# Patient Record
Sex: MALE | Race: BLACK OR AFRICAN AMERICAN | Employment: FULL TIME | URBAN - METROPOLITAN AREA
[De-identification: names, ages, dates, MRNs, and addresses within clinical notes are randomized per-mention and may not be internally consistent; named-entity substitution may affect disease eponyms.]

---

## 2021-04-21 ENCOUNTER — HOSPITAL ENCOUNTER (EMERGENCY)
Age: 56
Discharge: HOME | End: 2021-04-21
Attending: EMERGENCY MEDICINE
Payer: MEDICAID

## 2021-04-21 VITALS
SYSTOLIC BLOOD PRESSURE: 110 MMHG | HEIGHT: 68 IN | OXYGEN SATURATION: 98 % | BODY MASS INDEX: 26.75 KG/M2 | WEIGHT: 176.5 LBS | TEMPERATURE: 97.7 F | RESPIRATION RATE: 18 BRPM | DIASTOLIC BLOOD PRESSURE: 75 MMHG | HEART RATE: 95 BPM

## 2021-04-21 DIAGNOSIS — M54.31 SCIATICA OF RIGHT SIDE: Primary | ICD-10-CM

## 2021-04-21 PROCEDURE — 99283 EMERGENCY DEPT VISIT LOW MDM: CPT

## 2021-04-21 PROCEDURE — 96372 THER/PROPH/DIAG INJ SC/IM: CPT

## 2021-04-21 PROCEDURE — 74011250636 HC RX REV CODE- 250/636: Performed by: PHYSICIAN ASSISTANT

## 2021-04-21 RX ORDER — METHOCARBAMOL 500 MG/1
500 TABLET, FILM COATED ORAL
Qty: 20 TAB | Refills: 0 | Status: SHIPPED | OUTPATIENT
Start: 2021-04-21

## 2021-04-21 RX ORDER — TRAMADOL HYDROCHLORIDE 50 MG/1
50 TABLET ORAL
Qty: 10 TAB | Refills: 0 | Status: SHIPPED | OUTPATIENT
Start: 2021-04-21 | End: 2021-04-24

## 2021-04-21 RX ORDER — METHYLPREDNISOLONE 4 MG/1
TABLET ORAL
Qty: 1 DOSE PACK | Refills: 0 | Status: SHIPPED | OUTPATIENT
Start: 2021-04-21

## 2021-04-21 RX ORDER — KETOROLAC TROMETHAMINE 30 MG/ML
60 INJECTION, SOLUTION INTRAMUSCULAR; INTRAVENOUS ONCE
Status: COMPLETED | OUTPATIENT
Start: 2021-04-21 | End: 2021-04-21

## 2021-04-21 RX ADMIN — KETOROLAC TROMETHAMINE 60 MG: 30 INJECTION, SOLUTION INTRAMUSCULAR; INTRAVENOUS at 15:42

## 2021-04-21 NOTE — ED TRIAGE NOTES
Pt in with right buttock pain radiating down right leg x 4 days, pain in right knee. Pt states he has been taking ibuprofen and muscle relaxers for pain without relief. He has a history of sciatica approximately 10 years ago. He denies recent injury.

## 2021-04-21 NOTE — ED NOTES
Pt arrives to the ED AAOX4 with a c/c of right buttock pain radiating to RLE onset Saturday. Pt denies any injury, but states he believes he has sciatica as this pain is similar to what he experiences 10+ years ago when he was diagnosed. Pt is noted in stable condition, now in ED room with side rail up, bed to lowest position and call light within reach. Will continue to monitor and wait for ED provider evaluation. Emergency Department Nursing Plan of Care       The Nursing Plan of Care is developed from the Nursing assessment and Emergency Department Attending provider initial evaluation. The plan of care may be reviewed in the ED Provider note.     The Plan of Care was developed with the following considerations:   Patient / Family readiness to learn indicated by:verbalized understanding  Persons(s) to be included in education: patient  Barriers to Learning/Limitations:No    Signed     Deann Plate    4/21/2021   3:35 PM

## 2021-04-21 NOTE — ED PROVIDER NOTES
EMERGENCY DEPARTMENT HISTORY AND PHYSICAL EXAM    Date: 4/21/2021  Patient Name: Rae Green    History of Presenting Illness     Chief Complaint   Patient presents with    Back Pain    Leg Pain         History Provided By: Patient    HPI: Rae Green is a 54 y.o. male with a PMH of No significant past medical history who presents with R sided lower back pain that radiates down the R leg x 4 days. Pt reports hx of sciatica about 10yrs ago and states it feels the same now. Pt denies any injury or trauma, no heavy lifting pushing or pulling, no bowel or bladder incontinence. Pain is exacerbated with certain movements. Pt rates pain 10/10. PCP: None    Current Outpatient Medications   Medication Sig Dispense Refill    methylPREDNISolone (Medrol, Madhav,) 4 mg tablet Take as instructed 1 Dose Pack 0    methocarbamoL (Robaxin) 500 mg tablet Take 1 Tab by mouth every six (6) hours as needed (mm spasm). 20 Tab 0    traMADoL (Ultram) 50 mg tablet Take 1 Tab by mouth every six (6) hours as needed for Pain for up to 3 days. Max Daily Amount: 200 mg. 10 Tab 0    ibuprofen (MOTRIN) 800 mg tablet Take  by mouth every six (6) hours as needed for Pain.  DM/PSEUDOEPHED/ACETAMINOPHEN (THERAFLU NO-DROWSY FLU/CLD/CGH PO) Take 2 tablets by mouth every four (4) hours as needed.  albuterol (PROVENTIL HFA, VENTOLIN HFA, PROAIR HFA) 90 mcg/actuation inhaler Take 4 puffs by inhalation every four (4) hours as needed for Wheezing. 1 Inhaler 1       Past History     Past Medical History:  History reviewed. No pertinent past medical history. Past Surgical History:  History reviewed. No pertinent surgical history. Family History:  History reviewed. No pertinent family history. Social History:  Social History     Tobacco Use    Smoking status: Current Every Day Smoker     Packs/day: 0.50    Smokeless tobacco: Never Used   Substance Use Topics    Alcohol use: Yes    Drug use:  No Allergies:  No Known Allergies      Review of Systems   Review of Systems   Constitutional: Positive for activity change. Gastrointestinal: Negative for abdominal pain. Genitourinary: Negative for difficulty urinating and dysuria. Musculoskeletal: Positive for back pain and myalgias. Skin: Negative. Allergic/Immunologic: Negative for immunocompromised state. Neurological: Positive for numbness. Negative for speech difficulty and weakness. Psychiatric/Behavioral: Negative for self-injury. All other systems reviewed and are negative. Physical Exam     Vitals:    04/21/21 1524 04/21/21 1600   BP: 106/79 110/75   Pulse: 96 95   Resp: 20 18   Temp: 97.7 °F (36.5 °C)    SpO2: 96% 98%   Weight: 80.1 kg (176 lb 8 oz)    Height: 5' 8\" (1.727 m)      Physical Exam  Vitals signs and nursing note reviewed. Constitutional:       General: He is not in acute distress. Appearance: He is well-developed. HENT:      Head: Normocephalic and atraumatic. Mouth/Throat:      Pharynx: No oropharyngeal exudate. Eyes:      Conjunctiva/sclera: Conjunctivae normal.   Cardiovascular:      Rate and Rhythm: Normal rate and regular rhythm. Heart sounds: Normal heart sounds. Pulmonary:      Effort: Pulmonary effort is normal. No respiratory distress. Breath sounds: Normal breath sounds. No wheezing or rales. Abdominal:      General: Bowel sounds are normal.      Palpations: Abdomen is soft. Musculoskeletal: Normal range of motion. Lumbar back: He exhibits tenderness and pain (at R sciatic region, +R SLR). He exhibits no bony tenderness, no swelling, no edema and no deformity. Back:    Skin:     General: Skin is warm and dry. Neurological:      Mental Status: He is alert and oriented to person, place, and time. Diagnostic Study Results     Labs -   No results found for this or any previous visit (from the past 12 hour(s)).     Radiologic Studies -   No orders to display CT Results  (Last 48 hours)    None        CXR Results  (Last 48 hours)    None            Medical Decision Making   I am the first provider for this patient. I reviewed the vital signs, available nursing notes, past medical history, past surgical history, family history and social history. Vital Signs-Reviewed the patient's vital signs. Records Reviewed: Nursing Notes and Old Medical Records    Provider Notes (Medical Decision Making): The patient complains of low back pain that radiates down the R leg. These symptoms are consistent with a sciatica. Less likely  pathology, aortic dissection or ruptured AAA, or cauda equina given that there are no red flags and a benign physical exam.     Disposition:  Discharged    DISCHARGE NOTE:   3:50 PM      Care plan outlined and precautions discussed. Patient has no new complaints, changes, or physical findings. All medications were reviewed with the patient; will d/c home. All of pt's questions and concerns were addressed. Patient was instructed and agrees to follow up with PCP  prn, as well as to return to the ED upon further deterioration. Patient is ready to go home. Follow-up Information     Follow up With Specialties Details Why Bradley Syed  [facility], [address] [place] [address] Drive  [phone]          Discharge Medication List as of 4/21/2021  3:52 PM      START taking these medications    Details   methylPREDNISolone (Medrol, Madhav,) 4 mg tablet Take as instructed, Normal, Disp-1 Dose Pack, R-0      methocarbamoL (Robaxin) 500 mg tablet Take 1 Tab by mouth every six (6) hours as needed (mm spasm). , Normal, Disp-20 Tab, R-0      traMADoL (Ultram) 50 mg tablet Take 1 Tab by mouth every six (6) hours as needed for Pain for up to 3 days.  Max Daily Amount: 200 mg., Normal, Disp-10 Tab, R-0         CONTINUE these medications which have NOT CHANGED    Details   ibuprofen (MOTRIN) 800 mg tablet Take  by mouth every six (6) hours as needed for Pain., Historical Med      DM/PSEUDOEPHED/ACETAMINOPHEN (THERAFLU NO-DROWSY FLU/CLD/CGH PO) Take 2 tablets by mouth every four (4) hours as needed., Historical Med      albuterol (PROVENTIL HFA, VENTOLIN HFA, PROAIR HFA) 90 mcg/actuation inhaler Take 4 puffs by inhalation every four (4) hours as needed for Wheezing., Print, Disp-1 Inhaler, R-1             Procedures:  Procedures    Please note that this dictation was completed with Dragon, computer voice recognition software. Quite often unanticipated grammatical, syntax, homophones, and other interpretive errors are inadvertently transcribed by the computer software. Please disregard these errors. Additionally, please excuse any errors that have escaped final proofreading. Diagnosis     Clinical Impression:   1.  Sciatica of right side

## 2021-04-21 NOTE — ED NOTES
Discharge instructions provided. Pt was given copy of discharge instructions with 0 paper script(s) and 3 electronic script(s). Pt verbalized understanding of the medication instructions, and the importance of following up as recommended by EDP. Pt has no further questions at this time. Wheelchair offered from treatment area to hospital entrance, pt declined. Pt leaving ED ambulatory and in stable condition.

## 2022-03-12 ENCOUNTER — APPOINTMENT (OUTPATIENT)
Dept: GENERAL RADIOLOGY | Age: 57
End: 2022-03-12
Attending: EMERGENCY MEDICINE
Payer: MEDICAID

## 2022-03-12 ENCOUNTER — APPOINTMENT (OUTPATIENT)
Dept: CT IMAGING | Age: 57
End: 2022-03-12
Attending: EMERGENCY MEDICINE
Payer: MEDICAID

## 2022-03-12 ENCOUNTER — HOSPITAL ENCOUNTER (EMERGENCY)
Age: 57
Discharge: HOME | End: 2022-03-12
Attending: EMERGENCY MEDICINE
Payer: MEDICAID

## 2022-03-12 VITALS
DIASTOLIC BLOOD PRESSURE: 86 MMHG | WEIGHT: 176.59 LBS | BODY MASS INDEX: 26.76 KG/M2 | OXYGEN SATURATION: 99 % | TEMPERATURE: 98.2 F | HEART RATE: 86 BPM | HEIGHT: 68 IN | RESPIRATION RATE: 17 BRPM | SYSTOLIC BLOOD PRESSURE: 158 MMHG

## 2022-03-12 DIAGNOSIS — S82.141A CLOSED FRACTURE OF RIGHT TIBIAL PLATEAU, INITIAL ENCOUNTER: Primary | ICD-10-CM

## 2022-03-12 PROCEDURE — 74011250637 HC RX REV CODE- 250/637: Performed by: EMERGENCY MEDICINE

## 2022-03-12 PROCEDURE — 73562 X-RAY EXAM OF KNEE 3: CPT

## 2022-03-12 PROCEDURE — 99284 EMERGENCY DEPT VISIT MOD MDM: CPT

## 2022-03-12 PROCEDURE — 73700 CT LOWER EXTREMITY W/O DYE: CPT

## 2022-03-12 RX ORDER — HYDROCODONE BITARTRATE AND ACETAMINOPHEN 7.5; 325 MG/1; MG/1
1 TABLET ORAL ONCE
Status: COMPLETED | OUTPATIENT
Start: 2022-03-12 | End: 2022-03-12

## 2022-03-12 RX ORDER — HYDROCODONE BITARTRATE AND ACETAMINOPHEN 5; 325 MG/1; MG/1
1 TABLET ORAL
Qty: 18 TABLET | Refills: 0 | Status: SHIPPED | OUTPATIENT
Start: 2022-03-12 | End: 2022-03-12

## 2022-03-12 RX ORDER — IBUPROFEN 400 MG/1
800 TABLET ORAL ONCE
Status: COMPLETED | OUTPATIENT
Start: 2022-03-12 | End: 2022-03-12

## 2022-03-12 RX ORDER — HYDROCODONE BITARTRATE AND ACETAMINOPHEN 7.5; 325 MG/1; MG/1
1 TABLET ORAL
Qty: 12 TABLET | Refills: 0 | Status: SHIPPED | OUTPATIENT
Start: 2022-03-12 | End: 2022-03-15 | Stop reason: ALTCHOICE

## 2022-03-12 RX ADMIN — HYDROCODONE BITARTRATE AND ACETAMINOPHEN 1 TABLET: 7.5; 325 TABLET ORAL at 19:03

## 2022-03-12 RX ADMIN — IBUPROFEN 800 MG: 400 TABLET, FILM COATED ORAL at 19:03

## 2022-03-13 NOTE — DISCHARGE INSTRUCTIONS
You cannot bear weight on your right leg. Keep the leg elevated as much as possible and ice the knee (20min on, 20min off).  Please call ortho on Monday for a follow up appointment

## 2022-03-13 NOTE — ED NOTES
Received a call from the pharmacy but they do not have any 5 mg hydrocodones in stock. Pharmacy unable to take a verbal order to change the prescription. New electronic prescription sent. Initial prescription will not be filled.   Jing Wang MD

## 2022-03-13 NOTE — ED NOTES
Emergency Department Nursing Plan of Care       The Nursing Plan of Care is developed from the Nursing assessment and Emergency Department Attending provider initial evaluation. The plan of care may be reviewed in the ED Provider note.     The Plan of Care was developed with the following considerations:   Patient / Family readiness to learn indicated by:verbalized understanding  Persons(s) to be included in education: patient  Barriers to Learning/Limitations:No    Signed   Roel Low RN    1/28/2018   1:31 AM

## 2022-03-13 NOTE — ED NOTES
Pt discharged from ED ambulatory with crutches. Pt given instructions, medications called in to pharmacy. Pt awake, alert, pink, warm, and dry.

## 2022-03-15 ENCOUNTER — OFFICE VISIT (OUTPATIENT)
Dept: ORTHOPEDIC SURGERY | Age: 57
End: 2022-03-15
Payer: MEDICAID

## 2022-03-15 VITALS
DIASTOLIC BLOOD PRESSURE: 94 MMHG | HEIGHT: 68 IN | WEIGHT: 176 LBS | TEMPERATURE: 98.7 F | HEART RATE: 71 BPM | SYSTOLIC BLOOD PRESSURE: 157 MMHG | BODY MASS INDEX: 26.67 KG/M2 | OXYGEN SATURATION: 97 %

## 2022-03-15 DIAGNOSIS — S82.144A CLOSED NONDISPLACED FRACTURE OF RIGHT TIBIAL PLATEAU, INITIAL ENCOUNTER: Primary | ICD-10-CM

## 2022-03-15 PROCEDURE — 99203 OFFICE O/P NEW LOW 30 MIN: CPT | Performed by: ORTHOPAEDIC SURGERY

## 2022-03-15 RX ORDER — HYDROCODONE BITARTRATE AND ACETAMINOPHEN 7.5; 325 MG/1; MG/1
1 TABLET ORAL
Qty: 42 TABLET | Refills: 0 | Status: SHIPPED | OUTPATIENT
Start: 2022-03-15 | End: 2022-03-22

## 2022-03-15 RX ORDER — IBUPROFEN 800 MG/1
800 TABLET ORAL
Qty: 60 TABLET | Refills: 0 | OUTPATIENT
Start: 2022-03-15 | End: 2022-04-17

## 2022-03-15 NOTE — PROGRESS NOTES
3/15/2022    Chief Complaint: Right knee pain    HPI: This is a(n) 64 y.o. male  who complains of Right knee pain. Onset was sudden after he fell from standing. The patient has had pain for 1 week since the injury. The pain is in the knee globally, it is moderate in intensity. The patient has tried activity modification, no physical therapy, injections have not been attempted. He was seen in the emergency department and asked to follow-up, a CT scan was provided and he was given oxycodone and knee immobilizer. History reviewed. No pertinent past medical history. History reviewed. No pertinent surgical history. Current Outpatient Medications on File Prior to Visit   Medication Sig Dispense Refill    methylPREDNISolone (Medrol, Madhav,) 4 mg tablet Take as instructed (Patient not taking: Reported on 3/15/2022) 1 Dose Pack 0    methocarbamoL (Robaxin) 500 mg tablet Take 1 Tab by mouth every six (6) hours as needed (mm spasm). (Patient not taking: Reported on 3/15/2022) 20 Tab 0    DM/PSEUDOEPHED/ACETAMINOPHEN (THERAFLU NO-DROWSY FLU/CLD/CGH PO) Take 2 tablets by mouth every four (4) hours as needed. (Patient not taking: Reported on 3/15/2022)      albuterol (PROVENTIL HFA, VENTOLIN HFA, PROAIR HFA) 90 mcg/actuation inhaler Take 4 puffs by inhalation every four (4) hours as needed for Wheezing. (Patient not taking: Reported on 3/15/2022) 1 Inhaler 1     No current facility-administered medications on file prior to visit. No Known Allergies    No family history on file. Social History     Socioeconomic History    Marital status: SINGLE   Tobacco Use    Smoking status: Current Every Day Smoker     Packs/day: 0.50    Smokeless tobacco: Never Used   Substance and Sexual Activity    Alcohol use:  Yes    Drug use: No         Review of Systems:       General: Denies headache, lethargy, fever, weight loss  Ears/Nose/Throat: Denies ear discharge, drainage, nosebleeds, hoarse voice, dental problems  Cardiovascular: Denies chest pain, shortness of breath  Lungs: Denies chest pain, breathing problems, wheezing, pneumonia  Stomach: Denies stomach pain, heartburn, constipation, irritable bowel  Skin: Denies rash, sores, open wounds  Musculoskeletal: Admits to knee pain, no deformity. Genitourinary: Denies dysuria, hematuria, polyuria  Gastrointestinal: Denies constipation, obstipation, diarrhea  Neurological: Denies changes in sight, smell, hearing, taste, seizures. Denies loss of consciousness. Psychiatric: Denies depression, sleep pattern changes, anxiety, change in personality  Endocrine: Denies mood swings, heat or cold intolerance  Hematologic/Lymphatic: Denies anemia, purpura, petechia  Allergic/Immunologic: Denies swelling of throat, pain or swelling at lymph nodes      Physical Examination:    Visit Vitals  BP (!) 157/94 (BP 1 Location: Right arm, BP Patient Position: Sitting, BP Cuff Size: Large adult)   Pulse 71   Temp 98.7 °F (37.1 °C) (Tympanic)   Ht 5' 8\" (1.727 m)   Wt 176 lb (79.8 kg)   SpO2 97%   BMI 26.76 kg/m²        General: AOX3, no apparent distress  Psychiatric: mood and affect appropriate  Lungs: breathing is symmetric and unlabored bilaterally  Heart: regular rate and rhythm  Abdomen: no guarding  Head: normocephalic, atraumatic  Skin: No significant abnormalities, good turgor  Sensation intact to light touch: L1-S1 dermatomes  Muscular exam: 5/5 strength in all major muscle groups unless noted in specialty exam.    Extremities:      Left upper extremity: Full active and passive range of motion without pain, deformity, no open wound, strength 5/5 in all major muscle groups. Right upper extremity: Full active and passive range of motion without pain, deformity, no open wound, strength 5/5 in all major muscle groups. Left lower extremity: Full active and passive range of motion without pain, deformity, no open wound, strength 5/5 in all major muscle groups.     Right lower extremity:  No deformity is noted. Range of motion of the knee is not tested. Ligamentous testing of the knee was not performed. Lachman's, anterior and posterior drawer tests are specifically not performed. Joint line tenderness to palpation medially and laterally. Popliteal area is unremarkable. Moderate effusion. No patellar crepitus. Patella tracks centrally within a minimum of range of motion with a negative apprehension and grind test.  Pivot shift not tested. Strength testing is indicative of 5/5 strength at hip flexion, extension, tibialis anterior, EHL, and FHL. Sensation is intact to light touch in the L1-S1 dermatomes. Capillary refill is less than 2 seconds in the toes. Diagnostics:    Pertinent Diagnostics:   X-rays and CT are reviewed of the right knee, nondisplaced tibial plateau fracture without depression is noted. Moderate effusion and hemarthrosis. Assessment: Pain in right knee, tibial plateau fracture, nondisplaced    Plan: This patient and I discussed that due to his nondisplaced nature of his fracture, closed management is possible. We did discuss the risks and benefits of treatment of nonoperative management of fractures. This does include, but are not limited to: stiffness, later displacement of fracture, potential for prolonged immobilization and longer recovery times, possible stiffness of associated joints, skin and deep tissue irritation or breakdown. As with all fractures good clinical outcome is dependent on bone healing, and mobilization of the soft tissues in a regimented and reasonable manner. Plan will be for oxycodone, continue to immobilizer, follow-up in 1 week with x-rays of the right knee. Mr. Leah Mondragon has a reminder for a \"due or due soon\" health maintenance. I have asked that he contact his primary care provider for follow-up on this health maintenance.

## 2022-03-15 NOTE — LETTER
NOTIFICATION RETURN TO WORK / SCHOOL    3/15/2022 11:06 AM    Mr. Rafa Raphael  [address] [facility] [id number]      To Whom It May Concern:    Rafa Raphael is currently under the care of Thor Crowe. He will be out of work until further notice. We will reevaluate him at his next appointment on Thursday, March 24th. If there are questions or concerns please have the patient contact our office.         Sincerely,      Lefty Potts, DO

## 2022-03-15 NOTE — PROGRESS NOTES
Identified pt with two pt identifiers (name and ). Reviewed chart in preparation for visit and have obtained necessary documentation. Daniel Buchanan is a 64 y.o. male  Chief Complaint   Patient presents with    ED Follow-up     RT tib fx     Visit Vitals  BP (!) 157/94 (BP 1 Location: Right arm, BP Patient Position: Sitting, BP Cuff Size: Large adult)   Pulse 71   Temp 98.7 °F (37.1 °C) (Tympanic)   Ht 5' 8\" (1.727 m)   Wt 176 lb (79.8 kg)   SpO2 97%   BMI 26.76 kg/m²     1. Have you been to the ER, urgent care clinic since your last visit? Hospitalized since your last visit? Yes Where: [facility]    2. Have you seen or consulted any other health care providers outside of the [address] [place] [address] since your last visit? Include any pap smears or colon screening.  No

## 2022-03-18 DIAGNOSIS — S82.144A CLOSED NONDISPLACED FRACTURE OF RIGHT TIBIAL PLATEAU, INITIAL ENCOUNTER: Primary | ICD-10-CM

## 2022-03-24 ENCOUNTER — OFFICE VISIT (OUTPATIENT)
Dept: ORTHOPEDIC SURGERY | Age: 57
End: 2022-03-24
Payer: MEDICAID

## 2022-03-24 ENCOUNTER — HOSPITAL ENCOUNTER (OUTPATIENT)
Dept: GENERAL RADIOLOGY | Age: 57
Discharge: HOME | End: 2022-03-24
Payer: MEDICAID

## 2022-03-24 VITALS
OXYGEN SATURATION: 98 % | HEART RATE: 80 BPM | DIASTOLIC BLOOD PRESSURE: 90 MMHG | BODY MASS INDEX: 26.67 KG/M2 | SYSTOLIC BLOOD PRESSURE: 150 MMHG | TEMPERATURE: 97.7 F | WEIGHT: 176 LBS | HEIGHT: 68 IN

## 2022-03-24 DIAGNOSIS — S82.144A CLOSED NONDISPLACED FRACTURE OF RIGHT TIBIAL PLATEAU, INITIAL ENCOUNTER: Primary | ICD-10-CM

## 2022-03-24 DIAGNOSIS — S82.144A CLOSED NONDISPLACED FRACTURE OF RIGHT TIBIAL PLATEAU, INITIAL ENCOUNTER: ICD-10-CM

## 2022-03-24 PROCEDURE — 99213 OFFICE O/P EST LOW 20 MIN: CPT | Performed by: ORTHOPAEDIC SURGERY

## 2022-03-24 PROCEDURE — 73560 X-RAY EXAM OF KNEE 1 OR 2: CPT

## 2022-03-24 NOTE — PROGRESS NOTES
3/24/2022      CC: Right knee pain    HPI:      This is a 64y.o. year old male who presents for a follow up visit. The patient was last seen and diagnosed with right tibial plateau fracture. The patient's treatments since the most recent visit have comprised of bracing, pain medications, toe-touch weightbearing. The patient has had moderate relief of the chief complaint. PMH:  History reviewed. No pertinent past medical history. PSxHx:  History reviewed. No pertinent surgical history. Meds:    Current Outpatient Medications:     ibuprofen (MOTRIN) 800 mg tablet, Take 1 Tablet by mouth every eight (8) hours as needed for Pain., Disp: 60 Tablet, Rfl: 0    methylPREDNISolone (Medrol, Madhav,) 4 mg tablet, Take as instructed (Patient not taking: Reported on 3/15/2022), Disp: 1 Dose Pack, Rfl: 0    methocarbamoL (Robaxin) 500 mg tablet, Take 1 Tab by mouth every six (6) hours as needed (mm spasm). (Patient not taking: Reported on 3/15/2022), Disp: 20 Tab, Rfl: 0    DM/PSEUDOEPHED/ACETAMINOPHEN (THERAFLU NO-DROWSY FLU/CLD/CGH PO), Take 2 tablets by mouth every four (4) hours as needed. (Patient not taking: Reported on 3/15/2022), Disp: , Rfl:     albuterol (PROVENTIL HFA, VENTOLIN HFA, PROAIR HFA) 90 mcg/actuation inhaler, Take 4 puffs by inhalation every four (4) hours as needed for Wheezing. (Patient not taking: Reported on 3/15/2022), Disp: 1 Inhaler, Rfl: 1    All:  No Known Allergies    Social Hx:  Social History     Socioeconomic History    Marital status: SINGLE   Tobacco Use    Smoking status: Current Every Day Smoker     Packs/day: 0.50    Smokeless tobacco: Never Used   Substance and Sexual Activity    Alcohol use: Yes    Drug use: No       Family Hx:  No family history on file.       Review of Systems:       General: Denies headache, lethargy, fever, weight loss  Ears/Nose/Throat: Denies ear discharge, drainage, nosebleeds, hoarse voice, dental problems  Cardiovascular: Denies chest pain, shortness of breath  Lungs: Denies chest pain, breathing problems, wheezing, pneumonia  Stomach: Denies stomach pain, heartburn, constipation, irritable bowel  Skin: Denies rash, sores, open wounds  Musculoskeletal: Resolving right knee pain  Genitourinary: Denies dysuria, hematuria, polyuria  Gastrointestinal: Denies constipation, obstipation, diarrhea  Neurological: Denies changes in sight, smell, hearing, taste, seizures. Denies loss of consciousness. Psychiatric: Denies depression, sleep pattern changes, anxiety, change in personality  Endocrine: Denies mood swings, heat or cold intolerance  Hematologic/Lymphatic: Denies anemia, purpura, petechia  Allergic/Immunologic: Denies swelling of throat, pain or swelling at lymph nodes      Physical Examination:    Visit Vitals  BP (!) 150/90 (BP 1 Location: Right arm, BP Patient Position: Sitting, BP Cuff Size: Large adult)   Pulse 80   Temp 97.7 °F (36.5 °C) (Tympanic)   Ht 5' 8\" (1.727 m)   Wt 176 lb (79.8 kg)   SpO2 98%   BMI 26.76 kg/m²        General: AOX3, no apparent distress  Psychiatric: mood and affect appropriate  Lungs: breathing is symmetric and unlabored bilaterally  Heart: regular rate and rhythm  Abdomen: no guarding  Head: normocephalic, atraumatic  Skin: No significant abnormalities, good turgor  Sensation intact to light touch: C5-T1 dermatomes  Muscular exam: 5/5 strength in all major muscle groups unless noted in specialty exam.    Extremities        Left lower extremity: Extremity is examined, no evidence of gross deformity, no gross motor or sensory deficit. Full active and passive range of motion is noted. Muscle tone and bulk is within normal expected limits. Capillary refill is less than 2 seconds distally. Right lower extremity: Reduction in his effusion. Range of motion not tested. Sensation intact light touch in L1-S1 dermatomes. Capillary refill less than 2 seconds distally.         Diagnostics:    Pertinent Diagnostics: X-rays ordered and reviewed by myself of the right knee indicate a nondisplaced tibial plateau fracture, no evidence of displacement, however there is no evidence of healing either    Assessment: Right tibial plateau fracture  Plan: This patient has the above-mentioned issue, he continues to be successful in treating this nonoperatively, I will place him into a knee range of motion brace and I will place him with a physical therapy for range of motion exercises. He will follow-up with me in 2 weeks for repeat x-ray of the right knee. He stated his understanding and satisfaction. Mr. Severino Musa has a reminder for a \"due or due soon\" health maintenance. I have asked that he contact his primary care provider for follow-up on this health maintenance.

## 2022-03-24 NOTE — PROGRESS NOTES
Identified pt with two pt identifiers (name and ). Reviewed chart in preparation for visit and have obtained necessary documentation. Lupe Kidd is a 64 y.o. male  Chief Complaint   Patient presents with    Follow-up     Visit Vitals  BP (!) 150/90 (BP 1 Location: Right arm, BP Patient Position: Sitting, BP Cuff Size: Large adult)   Pulse 80   Temp 97.7 °F (36.5 °C) (Tympanic)   Ht 5' 8\" (1.727 m)   Wt 176 lb (79.8 kg)   SpO2 98%   BMI 26.76 kg/m²     1. Have you been to the ER, urgent care clinic since your last visit? Hospitalized since your last visit? No    2. Have you seen or consulted any other health care providers outside of the [address] [place] [address] since your last visit? Include any pap smears or colon screening.  No

## 2022-04-04 DIAGNOSIS — S82.144A CLOSED NONDISPLACED FRACTURE OF RIGHT TIBIAL PLATEAU, INITIAL ENCOUNTER: Primary | ICD-10-CM

## 2022-04-07 ENCOUNTER — OFFICE VISIT (OUTPATIENT)
Dept: ORTHOPEDIC SURGERY | Age: 57
End: 2022-04-07
Payer: MEDICAID

## 2022-04-07 ENCOUNTER — HOSPITAL ENCOUNTER (OUTPATIENT)
Dept: GENERAL RADIOLOGY | Age: 57
Discharge: HOME | End: 2022-04-07
Payer: MEDICAID

## 2022-04-07 VITALS
HEIGHT: 68 IN | TEMPERATURE: 97.7 F | OXYGEN SATURATION: 99 % | DIASTOLIC BLOOD PRESSURE: 101 MMHG | BODY MASS INDEX: 27.28 KG/M2 | SYSTOLIC BLOOD PRESSURE: 154 MMHG | WEIGHT: 180 LBS | HEART RATE: 68 BPM

## 2022-04-07 DIAGNOSIS — S82.144A CLOSED NONDISPLACED FRACTURE OF RIGHT TIBIAL PLATEAU, INITIAL ENCOUNTER: ICD-10-CM

## 2022-04-07 DIAGNOSIS — S82.144A CLOSED NONDISPLACED FRACTURE OF RIGHT TIBIAL PLATEAU, INITIAL ENCOUNTER: Primary | ICD-10-CM

## 2022-04-07 PROCEDURE — 73560 X-RAY EXAM OF KNEE 1 OR 2: CPT

## 2022-04-07 PROCEDURE — 99213 OFFICE O/P EST LOW 20 MIN: CPT | Performed by: ORTHOPAEDIC SURGERY

## 2022-04-07 RX ORDER — OXYCODONE HYDROCHLORIDE 5 MG/1
5 TABLET ORAL
Qty: 28 TABLET | Refills: 0 | Status: SHIPPED | OUTPATIENT
Start: 2022-04-07 | End: 2022-04-14

## 2022-04-07 NOTE — PROGRESS NOTES
Identified pt with two pt identifiers (name and ). Reviewed chart in preparation for visit and have obtained necessary documentation. Benoit Retana is a 64 y.o. male  Chief Complaint   Patient presents with    Surgical Follow-up     RT tibia     Visit Vitals  BP (!) 154/101 (BP 1 Location: Right arm, BP Patient Position: Sitting, BP Cuff Size: Large adult)   Pulse 68   Temp 97.7 °F (36.5 °C) (Tympanic)   Ht 5' 8\" (1.727 m)   Wt 180 lb (81.6 kg)   SpO2 99%   BMI 27.37 kg/m²     1. Have you been to the ER, urgent care clinic since your last visit? Hospitalized since your last visit? No    2. Have you seen or consulted any other health care providers outside of the [address] [place] [address] since your last visit? Include any pap smears or colon screening.  No

## 2022-04-07 NOTE — PROGRESS NOTES
4/7/2022      CC: right knee pain    HPI:      This is a 64y.o. year old male who presents for a follow up visit. The patient was last seen and diagnosed with right tibial plateau fracture. The patient's treatments since the most recent visit have comprised of weight bearing restrictions. The patient has had moderate relief of the chief complaint. PMH:  History reviewed. No pertinent past medical history. PSxHx:  History reviewed. No pertinent surgical history. Meds:    Current Outpatient Medications:     oxyCODONE IR (ROXICODONE) 5 mg immediate release tablet, Take 1 Tablet by mouth every six (6) hours as needed for Pain for up to 7 days. Max Daily Amount: 20 mg., Disp: 28 Tablet, Rfl: 0    ibuprofen (MOTRIN) 800 mg tablet, Take 1 Tablet by mouth every eight (8) hours as needed for Pain., Disp: 60 Tablet, Rfl: 0    methylPREDNISolone (Medrol, Madhav,) 4 mg tablet, Take as instructed (Patient not taking: Reported on 3/15/2022), Disp: 1 Dose Pack, Rfl: 0    methocarbamoL (Robaxin) 500 mg tablet, Take 1 Tab by mouth every six (6) hours as needed (mm spasm). (Patient not taking: Reported on 3/15/2022), Disp: 20 Tab, Rfl: 0    DM/PSEUDOEPHED/ACETAMINOPHEN (THERAFLU NO-DROWSY FLU/CLD/CGH PO), Take 2 tablets by mouth every four (4) hours as needed. (Patient not taking: Reported on 3/15/2022), Disp: , Rfl:     albuterol (PROVENTIL HFA, VENTOLIN HFA, PROAIR HFA) 90 mcg/actuation inhaler, Take 4 puffs by inhalation every four (4) hours as needed for Wheezing. (Patient not taking: Reported on 3/15/2022), Disp: 1 Inhaler, Rfl: 1    All:  No Known Allergies    Social Hx:  Social History     Socioeconomic History    Marital status: SINGLE   Tobacco Use    Smoking status: Current Every Day Smoker     Packs/day: 0.50    Smokeless tobacco: Never Used   Substance and Sexual Activity    Alcohol use: Yes    Drug use: No       Family Hx:  No family history on file.       Review of Systems:       General: Denies headache, lethargy, fever, weight loss  Ears/Nose/Throat: Denies ear discharge, drainage, nosebleeds, hoarse voice, dental problems  Cardiovascular: Denies chest pain, shortness of breath  Lungs: Denies chest pain, breathing problems, wheezing, pneumonia  Stomach: Denies stomach pain, heartburn, constipation, irritable bowel  Skin: Denies rash, sores, open wounds  Musculoskeletal: right knee pain  Genitourinary: Denies dysuria, hematuria, polyuria  Gastrointestinal: Denies constipation, obstipation, diarrhea  Neurological: Denies changes in sight, smell, hearing, taste, seizures. Denies loss of consciousness. Psychiatric: Denies depression, sleep pattern changes, anxiety, change in personality  Endocrine: Denies mood swings, heat or cold intolerance  Hematologic/Lymphatic: Denies anemia, purpura, petechia  Allergic/Immunologic: Denies swelling of throat, pain or swelling at lymph nodes      Physical Examination:    Visit Vitals  BP (!) 154/101 (BP 1 Location: Right arm, BP Patient Position: Sitting, BP Cuff Size: Large adult)   Pulse 68   Temp 97.7 °F (36.5 °C) (Tympanic)   Ht 5' 8\" (1.727 m)   Wt 180 lb (81.6 kg)   SpO2 99%   BMI 27.37 kg/m²        General: AOX3, no apparent distress  Psychiatric: mood and affect appropriate  Lungs: breathing is symmetric and unlabored bilaterally  Heart: regular rate and rhythm  Abdomen: no guarding  Head: normocephalic, atraumatic  Skin: No significant abnormalities, good turgor  Sensation intact to light touch: C5-T1 dermatomes  Muscular exam: 5/5 strength in all major muscle groups unless noted in specialty exam.    Extremities        Left lower extremity:  No gross deformity. No restriction to range of motion of the hip, knee, ankle. Muscle bulk is appropriate without wasting. Sensation is intact to light touch in the L1-S1 dermatomes. Capillary refill is less than 2 seconds in the fingers.   Strength testing is 5/5 at the major muscle groups of the hip, knee, ankle.      Right lower extremity: No gross deformity. Mild swelling, TTP right knee No restriction to range of motion of the hip, knee, ankle. Muscle bulk is appropriate without wasting. Sensation is intact to light touch in the L1-S1 dermatomes. Capillary refill is less than 2 seconds in the fingers. Strength testing is 5/5 at the major muscle groups of the hip, knee, ankle. Diagnostics:    Pertinent Diagnostics: Xrays of the right knee are ordered and reviewed by myself. Healing tibial plateau noted, no significant displacement. Otherwise, indicate no fractures, osseus lesions, abnormalities, cartilage space is well maintained. Overall alignment is within normal limits, no effusion or other soft tissue abnormality. Assessment: right tibial plateau fracture  Plan: This patient is improving appropriately, we will begin weight bearing progressively with PT, plan for progressive weight bearing right leg. Follow up 4 weeks with final xray right knee      Mr. Vidhi Flood has a reminder for a \"due or due soon\" health maintenance. I have asked that he contact his primary care provider for follow-up on this health maintenance.

## 2022-04-17 ENCOUNTER — APPOINTMENT (OUTPATIENT)
Dept: GENERAL RADIOLOGY | Age: 57
End: 2022-04-17
Attending: PHYSICIAN ASSISTANT
Payer: MEDICAID

## 2022-04-17 ENCOUNTER — APPOINTMENT (OUTPATIENT)
Dept: CT IMAGING | Age: 57
End: 2022-04-17
Attending: PHYSICIAN ASSISTANT
Payer: MEDICAID

## 2022-04-17 ENCOUNTER — HOSPITAL ENCOUNTER (EMERGENCY)
Age: 57
Discharge: HOME | End: 2022-04-17
Attending: EMERGENCY MEDICINE
Payer: MEDICAID

## 2022-04-17 VITALS
TEMPERATURE: 98.4 F | SYSTOLIC BLOOD PRESSURE: 128 MMHG | HEIGHT: 68 IN | HEART RATE: 90 BPM | DIASTOLIC BLOOD PRESSURE: 66 MMHG | OXYGEN SATURATION: 99 % | WEIGHT: 170 LBS | RESPIRATION RATE: 17 BRPM | BODY MASS INDEX: 25.76 KG/M2

## 2022-04-17 DIAGNOSIS — K44.9 HIATAL HERNIA: ICD-10-CM

## 2022-04-17 DIAGNOSIS — S46.911A SHOULDER STRAIN, RIGHT, INITIAL ENCOUNTER: ICD-10-CM

## 2022-04-17 DIAGNOSIS — G56.01 CARPAL TUNNEL SYNDROME ON RIGHT: Primary | ICD-10-CM

## 2022-04-17 DIAGNOSIS — R07.89 CHEST TIGHTNESS: ICD-10-CM

## 2022-04-17 DIAGNOSIS — J20.9 ACUTE BRONCHITIS, UNSPECIFIED ORGANISM: ICD-10-CM

## 2022-04-17 LAB
ALBUMIN SERPL-MCNC: 3.7 G/DL (ref 3.5–5)
ALBUMIN/GLOB SERPL: 1 {RATIO} (ref 1.1–2.2)
ALP SERPL-CCNC: 85 U/L (ref 45–117)
ALT SERPL-CCNC: 40 U/L (ref 12–78)
ANION GAP SERPL CALC-SCNC: 13 MMOL/L (ref 5–15)
AST SERPL-CCNC: 47 U/L (ref 15–37)
BASOPHILS # BLD: 0 K/UL (ref 0–0.1)
BASOPHILS NFR BLD: 1 % (ref 0–1)
BILIRUB SERPL-MCNC: 0.3 MG/DL (ref 0.2–1)
BUN SERPL-MCNC: 9 MG/DL (ref 6–20)
BUN/CREAT SERPL: 9 (ref 12–20)
CALCIUM SERPL-MCNC: 8.5 MG/DL (ref 8.5–10.1)
CHLORIDE SERPL-SCNC: 103 MMOL/L (ref 97–108)
CO2 SERPL-SCNC: 23 MMOL/L (ref 21–32)
CREAT SERPL-MCNC: 0.97 MG/DL (ref 0.7–1.3)
D DIMER PPP FEU-MCNC: 1.51 MG/L FEU (ref 0–0.65)
DEPRECATED S PYO AG THROAT QL EIA: NEGATIVE
DIFFERENTIAL METHOD BLD: NORMAL
EOSINOPHIL # BLD: 0.2 K/UL (ref 0–0.4)
EOSINOPHIL NFR BLD: 5 % (ref 0–7)
ERYTHROCYTE [DISTWIDTH] IN BLOOD BY AUTOMATED COUNT: 14 % (ref 11.5–14.5)
GLOBULIN SER CALC-MCNC: 3.8 G/DL (ref 2–4)
GLUCOSE SERPL-MCNC: 114 MG/DL (ref 65–100)
HCT VFR BLD AUTO: 46.8 % (ref 36.6–50.3)
HGB BLD-MCNC: 15.6 G/DL (ref 12.1–17)
IMM GRANULOCYTES # BLD AUTO: 0 K/UL (ref 0–0.04)
IMM GRANULOCYTES NFR BLD AUTO: 0 % (ref 0–0.5)
LYMPHOCYTES # BLD: 1.6 K/UL (ref 0.8–3.5)
LYMPHOCYTES NFR BLD: 34 % (ref 12–49)
MCH RBC QN AUTO: 30.4 PG (ref 26–34)
MCHC RBC AUTO-ENTMCNC: 33.3 G/DL (ref 30–36.5)
MCV RBC AUTO: 91.1 FL (ref 80–99)
MONOCYTES # BLD: 0.6 K/UL (ref 0–1)
MONOCYTES NFR BLD: 13 % (ref 5–13)
NEUTS SEG # BLD: 2.3 K/UL (ref 1.8–8)
NEUTS SEG NFR BLD: 47 % (ref 32–75)
NRBC # BLD: 0 K/UL (ref 0–0.01)
NRBC BLD-RTO: 0 PER 100 WBC
PLATELET # BLD AUTO: 180 K/UL (ref 150–400)
PMV BLD AUTO: 11.9 FL (ref 8.9–12.9)
POTASSIUM SERPL-SCNC: 3.5 MMOL/L (ref 3.5–5.1)
PROT SERPL-MCNC: 7.5 G/DL (ref 6.4–8.2)
RBC # BLD AUTO: 5.14 M/UL (ref 4.1–5.7)
SODIUM SERPL-SCNC: 139 MMOL/L (ref 136–145)
TROPONIN-HIGH SENSITIVITY: 8 NG/L (ref 0–76)
WBC # BLD AUTO: 4.7 K/UL (ref 4.1–11.1)

## 2022-04-17 PROCEDURE — 80053 COMPREHEN METABOLIC PANEL: CPT

## 2022-04-17 PROCEDURE — 87880 STREP A ASSAY W/OPTIC: CPT

## 2022-04-17 PROCEDURE — 36415 COLL VENOUS BLD VENIPUNCTURE: CPT

## 2022-04-17 PROCEDURE — 85025 COMPLETE CBC W/AUTO DIFF WBC: CPT

## 2022-04-17 PROCEDURE — U0005 INFEC AGEN DETEC AMPLI PROBE: HCPCS

## 2022-04-17 PROCEDURE — 74011000636 HC RX REV CODE- 636: Performed by: EMERGENCY MEDICINE

## 2022-04-17 PROCEDURE — 73030 X-RAY EXAM OF SHOULDER: CPT

## 2022-04-17 PROCEDURE — 93005 ELECTROCARDIOGRAM TRACING: CPT

## 2022-04-17 PROCEDURE — 85379 FIBRIN DEGRADATION QUANT: CPT

## 2022-04-17 PROCEDURE — 71275 CT ANGIOGRAPHY CHEST: CPT

## 2022-04-17 PROCEDURE — 84484 ASSAY OF TROPONIN QUANT: CPT

## 2022-04-17 PROCEDURE — 71046 X-RAY EXAM CHEST 2 VIEWS: CPT

## 2022-04-17 PROCEDURE — 87070 CULTURE OTHR SPECIMN AEROBIC: CPT

## 2022-04-17 PROCEDURE — 99285 EMERGENCY DEPT VISIT HI MDM: CPT

## 2022-04-17 RX ORDER — IBUPROFEN 600 MG/1
600 TABLET ORAL
Qty: 30 TABLET | Refills: 0 | Status: SHIPPED | OUTPATIENT
Start: 2022-04-17 | End: 2022-04-27

## 2022-04-17 RX ORDER — BENZONATATE 100 MG/1
100 CAPSULE ORAL
Qty: 30 CAPSULE | Refills: 0 | Status: SHIPPED | OUTPATIENT
Start: 2022-04-17 | End: 2022-04-24

## 2022-04-17 RX ADMIN — IOPAMIDOL 100 ML: 755 INJECTION, SOLUTION INTRAVENOUS at 19:20

## 2022-04-17 NOTE — ED NOTES
Verbal shift change report given to Alexander Velasquez  (oncoming nurse) by Stef Wilde RN  (offgoing nurse). Report included the following information SBAR, ED Summary, Intake/Output, MAR and Recent Results.

## 2022-04-17 NOTE — ED PROVIDER NOTES
EMERGENCY DEPARTMENT HISTORY AND PHYSICAL EXAM      Date: 4/17/2022  Patient Name: Janneth Arriaga    History of Presenting Illness     Chief Complaint   Patient presents with    Chest Congestion     hx of smoker    Concern For COVID-19 (Coronavirus)     with sore throat    Arm Pain     right shoulder radiating down arm and into first 3 fingers of right hand, tingling numbness pain       History Provided By: Patient    HPI: Janneth Arriaga, 64 y.o. male who denies any past medical history who presents emergency department with multiple complaints. Patient's primary complaint is a constellation of sinus congestion, sore throat, nonproductive cough, chest tightness, diarrhea and subjective fevers over the past 3 days. He denies any dizziness, measurable fevers, purulent sputum, shortness of breath, nausea, vomiting, abdominal pain, sick contacts or illicit drug use. Patient does report a history of smoking. Patient fractured his knee several weeks ago and placed in a knee immobilizer. He denies any previous of history of blood clot, pleuritic pain, hemoptysis, calf pain redness or swelling, or active cancer. He has not taking any medication for it. Patient also complains of right shoulder pain for the past week. He does report a fall several weeks ago where he fractured his right patella, but denies any known injury of his shoulder at that time. He otherwise denies any inciting injury or trauma. Pain is worse with movement. Denies any redness or swelling. He has also had a tingling pain over his thumb index and middle finger of his right hand. It is worse at night and upon waking in the morning. .     There are no other complaints, changes, or physical findings at this time. PCP: None    No current facility-administered medications on file prior to encounter.      Current Outpatient Medications on File Prior to Encounter   Medication Sig Dispense Refill    ibuprofen (MOTRIN) 800 mg tablet Take 1 Tablet by mouth every eight (8) hours as needed for Pain. (Patient not taking: Reported on 4/17/2022) 60 Tablet 0    methylPREDNISolone (Medrol, Madhav,) 4 mg tablet Take as instructed (Patient not taking: Reported on 3/15/2022) 1 Dose Pack 0    methocarbamoL (Robaxin) 500 mg tablet Take 1 Tab by mouth every six (6) hours as needed (mm spasm). (Patient not taking: Reported on 3/15/2022) 20 Tab 0    DM/PSEUDOEPHED/ACETAMINOPHEN (THERAFLU NO-DROWSY FLU/CLD/CGH PO) Take 2 tablets by mouth every four (4) hours as needed. (Patient not taking: Reported on 3/15/2022)      albuterol (PROVENTIL HFA, VENTOLIN HFA, PROAIR HFA) 90 mcg/actuation inhaler Take 4 puffs by inhalation every four (4) hours as needed for Wheezing. (Patient not taking: Reported on 3/15/2022) 1 Inhaler 1       Past History     Past Medical History:  History reviewed. No pertinent past medical history. Past Surgical History:  History reviewed. No pertinent surgical history. Family History:  History reviewed. No pertinent family history. Social History:  Social History     Tobacco Use    Smoking status: Current Every Day Smoker     Packs/day: 0.50    Smokeless tobacco: Never Used   Substance Use Topics    Alcohol use: Yes    Drug use: No       Allergies:  No Known Allergies      Review of Systems   Review of Systems   Constitutional: Positive for fever. Negative for chills. HENT: Positive for congestion, rhinorrhea, sinus pressure and sore throat. Respiratory: Positive for cough. Negative for shortness of breath. Cardiovascular: Negative for chest pain. Gastrointestinal: Negative for abdominal pain, diarrhea, nausea and vomiting. Genitourinary: Negative for dysuria and hematuria. Musculoskeletal: Negative for myalgias. Skin: Negative for rash. Neurological: Negative for headaches. All other systems reviewed and are negative. Physical Exam   Physical Exam  Vitals and nursing note reviewed.    Constitutional: General: He is not in acute distress. Appearance: He is not toxic-appearing. Comments: Patient well-appearing, nontoxic   HENT:      Head: Atraumatic. Right Ear: External ear normal.      Left Ear: External ear normal.      Nose: Nose normal.      Mouth/Throat:      Mouth: Mucous membranes are moist.   Eyes:      Extraocular Movements: Extraocular movements intact. Conjunctiva/sclera: Conjunctivae normal.   Cardiovascular:      Rate and Rhythm: Normal rate and regular rhythm. Pulses: Normal pulses. Heart sounds: Normal heart sounds. No murmur heard. No friction rub. No gallop. Pulmonary:      Effort: Pulmonary effort is normal. No respiratory distress. Breath sounds: Normal breath sounds. No stridor. No wheezing, rhonchi or rales. Comments: Lung sounds clear with no adventitious breath sounds. No wheezing, crackles or stridor  Abdominal:      General: Abdomen is flat. There is no distension. Palpations: Abdomen is soft. Tenderness: There is no abdominal tenderness. There is no guarding or rebound. Musculoskeletal:         General: Tenderness present. No swelling. Cervical back: Neck supple. Comments: Generalized tenderness over posterior and anterior right shoulder. Range of motion fully intact patient able to abduct arm above head. Right upper extremity otherwise normal inspection with no redness, swelling or lesions. Skin:     General: Skin is warm. Neurological:      Mental Status: He is alert and oriented to person, place, and time. Psychiatric:         Mood and Affect: Mood normal.         Behavior: Behavior normal.         Thought Content:  Thought content normal.         Judgment: Judgment normal.         Diagnostic Study Results     Labs -     Recent Results (from the past 12 hour(s))   CBC WITH AUTOMATED DIFF    Collection Time: 04/17/22  6:13 PM   Result Value Ref Range    WBC 4.7 4.1 - 11.1 K/uL    RBC 5.14 4.10 - 5.70 M/uL    HGB 15.6 12.1 - 17.0 g/dL    HCT 46.8 36.6 - 50.3 %    MCV 91.1 80.0 - 99.0 FL    MCH 30.4 26.0 - 34.0 PG    MCHC 33.3 30.0 - 36.5 g/dL    RDW 14.0 11.5 - 14.5 %    PLATELET 566 819 - 970 K/uL    MPV 11.9 8.9 - 12.9 FL    NRBC 0.0 0  WBC    ABSOLUTE NRBC 0.00 0.00 - 0.01 K/uL    NEUTROPHILS 47 32 - 75 %    LYMPHOCYTES 34 12 - 49 %    MONOCYTES 13 5 - 13 %    EOSINOPHILS 5 0 - 7 %    BASOPHILS 1 0 - 1 %    IMMATURE GRANULOCYTES 0 0.0 - 0.5 %    ABS. NEUTROPHILS 2.3 1.8 - 8.0 K/UL    ABS. LYMPHOCYTES 1.6 0.8 - 3.5 K/UL    ABS. MONOCYTES 0.6 0.0 - 1.0 K/UL    ABS. EOSINOPHILS 0.2 0.0 - 0.4 K/UL    ABS. BASOPHILS 0.0 0.0 - 0.1 K/UL    ABS. IMM. GRANS. 0.0 0.00 - 0.04 K/UL    DF AUTOMATED     METABOLIC PANEL, COMPREHENSIVE    Collection Time: 04/17/22  6:13 PM   Result Value Ref Range    Sodium 139 136 - 145 mmol/L    Potassium 3.5 3.5 - 5.1 mmol/L    Chloride 103 97 - 108 mmol/L    CO2 23 21 - 32 mmol/L    Anion gap 13 5 - 15 mmol/L    Glucose 114 (H) 65 - 100 mg/dL    BUN 9 6 - 20 MG/DL    Creatinine 0.97 0.70 - 1.30 MG/DL    BUN/Creatinine ratio 9 (L) 12 - 20      GFR est AA >60 >60 ml/min/1.73m2    GFR est non-AA >60 >60 ml/min/1.73m2    Calcium 8.5 8.5 - 10.1 MG/DL    Bilirubin, total 0.3 0.2 - 1.0 MG/DL    ALT (SGPT) 40 12 - 78 U/L    AST (SGOT) 47 (H) 15 - 37 U/L    Alk. phosphatase 85 45 - 117 U/L    Protein, total 7.5 6.4 - 8.2 g/dL    Albumin 3.7 3.5 - 5.0 g/dL    Globulin 3.8 2.0 - 4.0 g/dL    A-G Ratio 1.0 (L) 1.1 - 2.2     TROPONIN-HIGH SENSITIVITY    Collection Time: 04/17/22  6:13 PM   Result Value Ref Range    Troponin-High Sensitivity 8 0 - 76 ng/L   D DIMER    Collection Time: 04/17/22  6:13 PM   Result Value Ref Range    D-dimer 1.51 (H) 0.00 - 0.65 mg/L FEU   STREP AG SCREEN, GROUP A    Collection Time: 04/17/22  6:13 PM    Specimen: Swab; Throat   Result Value Ref Range    Group A Strep Ag ID Negative NEG         Radiologic Studies -   XR CHEST PA LAT   Final Result   No acute findings. XR SHOULDER RT AP/LAT MIN 2 V   Final Result   No significant abnormality. .      CTA CHEST W OR W WO CONT    (Results Pending)     CT Results  (Last 48 hours)    None        CXR Results  (Last 48 hours)               04/17/22 1800  XR CHEST PA LAT Final result    Impression:  No acute findings. Narrative:  EXAM: XR CHEST PA LAT       INDICATION: Pneumonia       COMPARISON: Chest radiographs 1/26/2015       TECHNIQUE: PA and lateral chest views       FINDINGS:   Cardiac silhouette within normal limits. Aorta is tortuous. Lungs and pleural   spaces are grossly clear. Medical Decision Making   I am the first provider for this patient. I reviewed the vital signs, available nursing notes, past medical history, past surgical history, family history and social history. Vital Signs-Reviewed the patient's vital signs. Patient Vitals for the past 12 hrs:   Temp Pulse Resp BP SpO2   04/17/22 1706 98.1 °F (36.7 °C) 95 16 125/65 97 %       Records Reviewed: Nursing Notes    Provider Notes (Medical Decision Making):   Patient was evaluated for 3-day history of upper and lower respiratory symptoms. Patient did endorse some vague chest discomfort and given his risk for thromboembolic disease with a recent immobilization and trauma, pulmonary embolism was a differential diagnosis. Basic labs, chest x-ray were ordered. These did not reveal any acute findings, though his D-dimer is elevated which was followed by CTA, which did not show any acute findings. Troponin was within normal limits EKG did not show any ischemic changes. Low concern for emergent cardiopulmonary process. Patient also requested to be evaluated for musculoskeletal complaints of his right upper extremity after a fall few weeks ago. His symptoms of his right shoulder likely consistent with a muscular strain. X-ray not show any acute findings.   Patient is having symptoms consistent with carpal tunnel syndrome in his right hand. Patient was given a splint and will take anti-inflammatories as an outpatient. Patient informed of incidental findings of hyperglycemia and hiatal hernia for which she is to follow-up with his primary care doctor for further evaluation. Patient expressed understanding the discharge instructions and treatment plan. ED Course:   Initial assessment performed. The patients presenting problems have been discussed, and they are in agreement with the care plan formulated and outlined with them. I have encouraged them to ask questions as they arise throughout their visit. ED Course as of 04/17/22 2046   Sun Apr 17, 2022 2008 Patient evaluated for 3-day history of upper respiratory symptoms. Patient did endorse vague chest pain and has risk factors for thromboembolic disease due to her recent trauma and immobilization. Heart rate at 95. Otherwise well-appearing with normal vital signs. Will order basic labs, chest x-ray troponin and EKG. Patient also requested to be evaluated for musculoskeletal complaints in his right upper extremity after a trauma few weeks ago. Will order x-ray of his right shoulder. [AJ]   1913 D-dimer returned elevated at 1.51. Discussed lab results with patient. We will proceed with CTA to rule out PE. [AJ]   2036 EKG interpretation: Sinus rate and rhythm 81 bpm.  No axis deviation. LA interval 160 ms. Narrow QRS 92 ms. QTc 457 ms no ischemic ST/T wave changes. [AJ]      ED Course User Index  [AJ] LATOSHA Pina       Critical Care Time: None    Disposition:  discharged    PLAN:  1. Current Discharge Medication List        2. Follow-up Information    None       Return to ED if worse     Diagnosis     Clinical Impression:   1. Carpal tunnel syndrome on right    2. Shoulder strain, right, initial encounter          Please note that this dictation was completed with [employer], the [employer] voice recognition software.  Quite often unanticipated grammatical, syntax, homophones, and other interpretive errors are inadvertently transcribed by the computer software. Please disregards these errors. Please excuse any errors that have escaped final proofreading.

## 2022-04-17 NOTE — ED TRIAGE NOTES
Pt reports chills, body aches, chest congestion(tightness), sweating, sore throat, cough x 2 days. Pt reports chest tightness is worse when coughing. Vaccinated for Nohemi Perera. Taking allergy medication at home. Wants to be tested for COVID.

## 2022-04-17 NOTE — Clinical Note
[facility] EMERGENCY DEPT  [address] [id number] [phone]    Work/School Note    Date: 4/17/2022    To Whom It May concern:    Janneth Arriaga was seen and treated today in the emergency room by the following provider(s):  Attending Provider: Conchita Hager MD  Physician Assistant: LATOSHA Ovalles. Janneth Arriaga is excused from work/school on 4/17/2022 through 4/19/2022. He is medically clear to return to work/school on 4/20/2022.          Sincerely,          LATOSHA Boyce

## 2022-04-17 NOTE — ED NOTES
Patient here with c/o cough and congestion, with concern for covid. Patient states symptoms x2 days. Patient states that he has had night sweats as well as a sore throat. Patient states mild chest tightness with cough. Patient denies fevers but does report chills and body aches. Patient also c/o tingling and numbness pain in the first three fingers of his right hand. Patient states it originates in his right shoulder and radiates down but states symptoms worse in hand. Emergency Department Nursing Plan of Care       The Nursing Plan of Care is developed from the Nursing assessment and Emergency Department Attending provider initial evaluation. The plan of care may be reviewed in the ED Provider note.     The Plan of Care was developed with the following considerations:   Patient / Family readiness to learn indicated by:verbalized understanding  Persons(s) to be included in education: patient  Barriers to Learning/Limitations:No    Signed     Keith Alejo RN    4/17/2022   5:43 PM

## 2022-04-18 ENCOUNTER — PATIENT OUTREACH (OUTPATIENT)
Dept: CASE MANAGEMENT | Age: 57
End: 2022-04-18

## 2022-04-18 LAB
SARS-COV-2, XPLCVT: NOT DETECTED
SOURCE, COVRS: NORMAL

## 2022-04-18 NOTE — DISCHARGE INSTRUCTIONS
Please discussed your elevated blood sugar as well as her hiatal hernia found incidentally during your emergency department course. You may take decongestions, take a daily antihistamine, use a humidifier at night, and take the medications prescribed for your your symptoms. Follow-up with your primary care doctor if your symptoms have not resolved within 3 days. You may return the emergency department sooner if you have any new or worsening symptoms.

## 2022-04-18 NOTE — ED NOTES
Wrist splint applied to right wrist, pt tolerated well. Pt discharged from ED ambulatory with steady gait. Pt given instructions, medications sent to pharmacy, pt awake, alert, pink, warm, and dry.

## 2022-04-19 LAB
ATRIAL RATE: 81 BPM
BACTERIA SPEC CULT: NORMAL
CALCULATED P AXIS, ECG09: 43 DEGREES
CALCULATED R AXIS, ECG10: 48 DEGREES
CALCULATED T AXIS, ECG11: 32 DEGREES
DIAGNOSIS, 93000: NORMAL
P-R INTERVAL, ECG05: 160 MS
Q-T INTERVAL, ECG07: 394 MS
QRS DURATION, ECG06: 92 MS
QTC CALCULATION (BEZET), ECG08: 457 MS
SERVICE CMNT-IMP: NORMAL
VENTRICULAR RATE, ECG03: 81 BPM

## 2022-04-20 ENCOUNTER — HOSPITAL ENCOUNTER (OUTPATIENT)
Dept: PHYSICAL THERAPY | Age: 57
Discharge: HOME | End: 2022-04-20
Payer: MEDICAID

## 2022-04-20 PROCEDURE — 97161 PT EVAL LOW COMPLEX 20 MIN: CPT | Performed by: PHYSICAL THERAPIST

## 2022-04-20 NOTE — PROGRESS NOTES
PT INITIAL EVALUATION NOTE 2-15    Patient Name: Lennox Corona  Date:2022  : 1965  [x]  Patient  Verified  Payor: Shari Manpreet / Plan: [address] [id number] / Product Type: Managed Care Medicaid /    In time:4:25pm  Out time: 5:15pm  Total Treatment Time (min): 40  Visit #: 1     Treatment Area: Right leg pain [M79.604]    SUBJECTIVE  Pain Level (0-10 scale): 3 (3-10/10)  Any medication changes, allergies to medications, adverse drug reactions, diagnosis change, or new procedure performed?: [] No    [x] Yes (see summary sheet for update)  Subjective: The patient reports that he fell off a 15ft ladder about 4-5 weeks, resulting in a tibial plateau fracture. He was in a brace until recently and was cleared by the doctor to progress with weightbearing. He's been on his feet for at most 1 hour. He's not limited with standing. It will occasionally irritate him at night, every night. He's doing one step at a time. Ice helps.         OBJECTIVE/EXAMINATION  Posture:  Scapular protraction bilaterallly  Other Observations:  Bruising around right knee with abrasion scar on patella; SLS: L= 30s; R= 30s very unsteady  Gait and Functional Mobility:  Antalgic gait; decreased knee extension on the right  Palpation: tender around knee    A/PROM Right Knee: Flex 115/120  Ext -15/0  A/PROM Left Knee: Flex 125/130  Ext 0/0    Joint Mobility Assessment: hypomobile right knee    Flexibility: tight hamstring/calves    LOWER QUARTER   MUSCLE STRENGTH  KEY       R  L  0 - No Contraction  Knee ext  3-, p!  5  1 - Trace            flex  3-, p!  5  2 - Poor   Hip ext   nt  nt  3 - Fair          flex   4  4  4 - Good         abd  nt  nt  5 - Normal         add  nt  nt      Ankle DF  5  5                PF  5  5                INV  nt  nt                EV  nt  nt      MMT: nt  Neurological: Reflexes / Sensations: nt    7 min Therapeutic Exercise:  [x] See flow sheet :   Rationale: increase ROM, increase strength and improve coordination to improve the patients ability to perform daily activities.           With   [x] TE   [] TA   [] Neuro   [] SC   [] other: Patient Education: [x] Review HEP    [x] Progressed/Changed HEP based on:   [x] positioning   [x] body mechanics   [] transfers   [] heat/ice application    [] other:        Other Objective/Functional Measures: FOTO Functional Measure: 54/100                  Pain Level (0-10 scale) post treatment: 3      ASSESSMENT:      [x]  See Plan of [facility], PT 4/20/2022

## 2022-04-20 NOTE — PROGRESS NOTES
[facility]  EmanuelDisha Dy[address] (MOB IV), [address] Jessica Gutiérrez  Phone: [phone] Fax: [phone]    Plan of Care/Statement of Necessity for Physical Therapy Services  2-15    Patient name: Conor Phelps  : 1965  Provider#: [id number]  Referral source: Lisset Wells DO      Medical/Treatment Diagnosis: Right leg pain [M79.604]     Prior Hospitalization: see medical history     Comorbidities: none   Prior Level of Function: 20 min of exercise at least 3x/wk  Medications: Verified on Patient Summary List    Start of Care: 22      Onset Date: tibial plateau fracture s/p fall 3/12/22       The Plan of Care and following information is based on the information from the initial evaluation. Assessment/ key information: The patient presents with a chief complaint of right knee pain, decreased strength, and decreased ROM that is consistent with a  tibial plateau fracture s/p fall on 3/12/22. He was recently discharged from his brace and is progressively WBAT. He states he has been an hour on his feet at one time without having to sit. He is lacking 15 degrees of AROM extension, but has 115/120 A/PROM flexion. The patient will benefit from guided therapeutic interventions such as therex for strengthening and neuromuscular re-education, manual techniques for joint mobility and soft tissue extensibility, and modalities for pain management in order to improve functional mobility with daily activities. Evaluation Complexity History LOW Complexity : Zero comorbidities / personal factors that will impact the outcome / POC; Examination MEDIUM Complexity : 3 Standardized tests and measures addressing body structure, function, activity limitation and / or participation in recreation  ;Presentation MEDIUM Complexity : Evolving with changing characteristics  ; Clinical Decision Making MEDIUM Complexity : FOTO score of 26-74  Overall Complexity Rating: LOW     Problem List: pain affecting function, decrease ROM, decrease strength, edema affecting function, impaired gait/ balance, decrease ADL/ functional abilitiies, decrease activity tolerance, decrease flexibility/ joint mobility and decrease transfer abilities   Treatment Plan may include any combination of the following: Therapeutic exercise, Therapeutic activities, Neuromuscular re-education, Physical agent/modality, Gait/balance training, Manual therapy, Patient education, Self Care training, Functional mobility training, Home safety training and Stair training  Patient / Family readiness to learn indicated by: asking questions, trying to perform skills and interest  Persons(s) to be included in education: patient (P)  Barriers to Learning/Limitations: None  Patient Goal (s): walking normally again  Patient Self Reported Health Status: good  Rehabilitation Potential: good    Short Term Goals: To be accomplished in 2 treatments:                         1.) The patient will be independent with their HEP consistently for at least one week. Long Term Goals: To be accomplished in 16 treatments:                         1.) The patient will have at most 4/10 pain with daily activities. 2.) The patient will be able to ambulate for over 1 hour without having to change positions due to pain. 3.) The patient will improve their FOTO score from 54 to at least 65 to show improvements in functional mobility. 4.) The patient will improve his A/PROM knee extension to at least -5/0 and flexion to at least 120/125 to assist with daily activities. Frequency / Duration: Patient to be seen 2 times per week for 16 treatments.       Patient/ Caregiver education and instruction: self care, activity modification, brace/ splint application and exercises    [x]  Plan of care has been reviewed with EMELIA Wilson, PT 4/20/2022 ________________________________________________________________________    I certify that the above Therapy Services are being furnished while the patient is under my care. I agree with the treatment plan and certify that this therapy is necessary.     [de-identified] Signature:____________________  Date:____________Time: _________      May Esters, DO

## 2022-05-02 DIAGNOSIS — S82.144A CLOSED NONDISPLACED FRACTURE OF RIGHT TIBIAL PLATEAU, INITIAL ENCOUNTER: Primary | ICD-10-CM

## 2022-05-03 ENCOUNTER — HOSPITAL ENCOUNTER (OUTPATIENT)
Dept: PHYSICAL THERAPY | Age: 57
Discharge: HOME | End: 2022-05-03
Payer: MEDICAID

## 2022-05-03 PROCEDURE — 97110 THERAPEUTIC EXERCISES: CPT | Performed by: PHYSICAL THERAPIST

## 2022-05-03 PROCEDURE — 97016 VASOPNEUMATIC DEVICE THERAPY: CPT | Performed by: PHYSICAL THERAPIST

## 2022-05-03 NOTE — PROGRESS NOTES
PT DAILY TREATMENT NOTE 2-15    Patient Name: Jaylyn Blue  Date:5/3/2022  : 1965  [x]  Patient  Verified  Payor: Noemy Esteves / Plan: [address] [place] [address] / Product Type: Managed Care Medicaid /    In time: 1:34pm  Out time: 2:50pm  Total Treatment Time (min): 76  Visit #:  2    Treatment Area: Right leg pain [M79.604]    SUBJECTIVE  Pain Level (0-10 scale): 4  Any medication changes, allergies to medications, adverse drug reactions, diagnosis change, or new procedure performed?: [x] No    [] Yes (see summary sheet for update)  Subjective functional status/changes:   [] No changes reported  The patient reports that his work boots may be too heavy as they irritate his knee, otherwise he's doing okay. OBJECTIVE    Modality rationale: decrease edema, decrease inflammation and decrease pain to improve the patients ability to perform daily activities.    Min Type Additional Details       [] Estim: []Att   []Unatt    []TENS instruct                  []IFC  []Premod   []NMES                     []Other:  []w/US   []w/ice   []w/heat  Position:  Location:       []  Traction: [] Cervical       []Lumbar                       [] Prone          []Supine                       []Intermittent   []Continuous Lbs:  [] before manual  [] after manual  []w/heat    []  Ultrasound: []Continuous   [] Pulsed                       at: []1MHz   []3MHz Location:  W/cm2:    [] Paraffin         Location:   []w/heat    []  Ice     []  Heat  []  Ice massage Position:  Location:    []  Laser  []  Other: Position:  Location:   10   [x]  Vasopneumatic Device Pressure:       [] lo [x] med [] hi   Temperature: 34     [x] Skin assessment post-treatment:  [x]intact []redness- no adverse reaction    []redness  adverse reaction:       66 min Therapeutic Exercise:  [x] See flow sheet :   Rationale: increase ROM, increase strength and improve coordination to improve the patients ability to perform daily activities. With   [x] TE   [] TA   [] Neuro   [] SC   [] other: Patient Education: [x] Review HEP    [x] Progressed/Changed HEP based on:   [x] positioning   [x] body mechanics   [] transfers   [] heat/ice application    [] other:      Other Objective/Functional Measures: none noted     Pain Level (0-10 scale) post treatment: 0    ASSESSMENT/Changes in Function:   The patient progressed with increased resistance as tolerated, but has noticeable quad strength deficits in weightbearing. Patient will continue to benefit from skilled PT services to modify and progress therapeutic interventions, address functional mobility deficits, address ROM deficits, address strength deficits, analyze and address soft tissue restrictions, analyze and cue movement patterns, analyze and modify body mechanics/ergonomics, assess and modify postural abnormalities, address imbalance/dizziness and instruct in home and community integration to attain remaining goals. [x]  See Plan of Care  []  See progress note/recertification  []  See Discharge Summary         Progress towards goals / Updated goals: The patient is progressing towards goals.     PLAN  [x]  Upgrade activities as tolerated     [x]  Continue plan of care  [x]  Update interventions per flow sheet       []  Discharge due to:_  []  Other:_      Giovani Hernandez, PT 5/3/2022

## 2022-05-05 ENCOUNTER — OFFICE VISIT (OUTPATIENT)
Dept: ORTHOPEDIC SURGERY | Age: 57
End: 2022-05-05
Payer: MEDICAID

## 2022-05-05 ENCOUNTER — APPOINTMENT (OUTPATIENT)
Dept: PHYSICAL THERAPY | Age: 57
End: 2022-05-05
Payer: MEDICAID

## 2022-05-05 ENCOUNTER — HOSPITAL ENCOUNTER (OUTPATIENT)
Dept: GENERAL RADIOLOGY | Age: 57
Discharge: HOME | End: 2022-05-05
Payer: MEDICAID

## 2022-05-05 VITALS
DIASTOLIC BLOOD PRESSURE: 87 MMHG | HEIGHT: 68 IN | SYSTOLIC BLOOD PRESSURE: 130 MMHG | BODY MASS INDEX: 27.13 KG/M2 | OXYGEN SATURATION: 99 % | WEIGHT: 179 LBS | HEART RATE: 75 BPM | TEMPERATURE: 97.8 F

## 2022-05-05 DIAGNOSIS — S82.144A CLOSED NONDISPLACED FRACTURE OF RIGHT TIBIAL PLATEAU, INITIAL ENCOUNTER: ICD-10-CM

## 2022-05-05 DIAGNOSIS — S82.144A CLOSED NONDISPLACED FRACTURE OF RIGHT TIBIAL PLATEAU, INITIAL ENCOUNTER: Primary | ICD-10-CM

## 2022-05-05 PROCEDURE — 99213 OFFICE O/P EST LOW 20 MIN: CPT | Performed by: ORTHOPAEDIC SURGERY

## 2022-05-05 PROCEDURE — 73560 X-RAY EXAM OF KNEE 1 OR 2: CPT

## 2022-05-05 NOTE — PROGRESS NOTES
5/5/2022      CC: right knee pain    HPI:      This is a 64y.o. year old male who presents for a follow up visit. The patient was last seen and diagnosed with right tibial plateau fracture. The patient's treatments since the most recent visit have comprised of mobilization, pain control. The patient has had good relief of the chief complaint. PMH:  History reviewed. No pertinent past medical history. PSxHx:  History reviewed. No pertinent surgical history. Meds:    Current Outpatient Medications:     methylPREDNISolone (Medrol, Madhav,) 4 mg tablet, Take as instructed (Patient not taking: Reported on 3/15/2022), Disp: 1 Dose Pack, Rfl: 0    methocarbamoL (Robaxin) 500 mg tablet, Take 1 Tab by mouth every six (6) hours as needed (mm spasm). (Patient not taking: Reported on 3/15/2022), Disp: 20 Tab, Rfl: 0    DM/PSEUDOEPHED/ACETAMINOPHEN (THERAFLU NO-DROWSY FLU/CLD/CGH PO), Take 2 tablets by mouth every four (4) hours as needed. (Patient not taking: Reported on 3/15/2022), Disp: , Rfl:     albuterol (PROVENTIL HFA, VENTOLIN HFA, PROAIR HFA) 90 mcg/actuation inhaler, Take 4 puffs by inhalation every four (4) hours as needed for Wheezing. (Patient not taking: Reported on 3/15/2022), Disp: 1 Inhaler, Rfl: 1    All:  No Known Allergies    Social Hx:  Social History     Socioeconomic History    Marital status: SINGLE   Tobacco Use    Smoking status: Current Every Day Smoker     Packs/day: 0.50    Smokeless tobacco: Never Used   Substance and Sexual Activity    Alcohol use: Yes    Drug use: No       Family Hx:  No family history on file.       Review of Systems:       General: Denies headache, lethargy, fever, weight loss  Ears/Nose/Throat: Denies ear discharge, drainage, nosebleeds, hoarse voice, dental problems  Cardiovascular: Denies chest pain, shortness of breath  Lungs: Denies chest pain, breathing problems, wheezing, pneumonia  Stomach: Denies stomach pain, heartburn, constipation, irritable bowel  Skin: Denies rash, sores, open wounds  Musculoskeletal: no issues  Genitourinary: Denies dysuria, hematuria, polyuria  Gastrointestinal: Denies constipation, obstipation, diarrhea  Neurological: Denies changes in sight, smell, hearing, taste, seizures. Denies loss of consciousness. Psychiatric: Denies depression, sleep pattern changes, anxiety, change in personality  Endocrine: Denies mood swings, heat or cold intolerance  Hematologic/Lymphatic: Denies anemia, purpura, petechia  Allergic/Immunologic: Denies swelling of throat, pain or swelling at lymph nodes      Physical Examination:    Visit Vitals  /87 (BP 1 Location: Right arm, BP Patient Position: Sitting, BP Cuff Size: Large adult)   Pulse 75   Temp 97.8 °F (36.6 °C) (Tympanic)   Ht 5' 8\" (1.727 m)   Wt 179 lb (81.2 kg)   SpO2 99%   BMI 27.22 kg/m²        General: AOX3, no apparent distress  Psychiatric: mood and affect appropriate  Lungs: breathing is symmetric and unlabored bilaterally  Heart: regular rate and rhythm  Abdomen: no guarding  Head: normocephalic, atraumatic  Skin: No significant abnormalities, good turgor  Sensation intact to light touch: C5-T1 dermatomes  Muscular exam: 5/5 strength in all major muscle groups unless noted in specialty exam.    Extremities        Left lower extremity:  No gross deformity. No restriction to range of motion of the hip, knee, ankle. Muscle bulk is appropriate without wasting. Sensation is intact to light touch in the L1-S1 dermatomes. Capillary refill is less than 2 seconds in the fingers. Strength testing is 5/5 at the major muscle groups of the hip, knee, ankle. Right lower extremity: No gross deformity. No restriction to range of motion of the hip, knee, ankle. Muscle bulk is appropriate without wasting. Sensation is intact to light touch in the L1-S1 dermatomes. Capillary refill is less than 2 seconds in the fingers.   Strength testing is 5/5 at the major muscle groups of the hip, knee, ankle. Diagnostics:    Pertinent Diagnostics: xrays ordered and reviewed by myself of the right knee indicate anatomic alignment of tibial plateau fracture, no further displacement, increased density indicating further healing. Assessment: healed tibial plateau fracture  Plan: This patient will continue to ambulate, continue to work on strength and range of motion. Plan for return to full activity without restriction in three weeks, before that, he can continue to work on strength, range of motion, non-prescription pain modalities. Follow up prn. Mr. Elaine Larson has a reminder for a \"due or due soon\" health maintenance. I have asked that he contact his primary care provider for follow-up on this health maintenance.

## 2022-05-05 NOTE — PROGRESS NOTES
Identified pt with two pt identifiers (name and ). Reviewed chart in preparation for visit and have obtained necessary documentation. Ioana Lind is a 64 y.o. male  Chief Complaint   Patient presents with    Surgical Follow-up     RT tib     Visit Vitals  /87 (BP 1 Location: Right arm, BP Patient Position: Sitting, BP Cuff Size: Large adult)   Pulse 75   Temp 97.8 °F (36.6 °C) (Tympanic)   Ht 5' 8\" (1.727 m)   Wt 179 lb (81.2 kg)   SpO2 99%   BMI 27.22 kg/m²     1. Have you been to the ER, urgent care clinic since your last visit? Hospitalized since your last visit? No    2. Have you seen or consulted any other health care providers outside of the [address] [place] [address] since your last visit? Include any pap smears or colon screening.  No

## 2022-06-22 NOTE — ED PROVIDER NOTES
EMERGENCY DEPARTMENT HISTORY AND PHYSICAL EXAM      Date: 3/12/2022  Patient Name: Tg Das    History of Presenting Illness     Chief Complaint   Patient presents with    Knee Pain       History Provided By: Patient    HPI: Tg Das, 64 y.o. male with PMHx significant for nothing who presents with a chief complaint of right knee pain after a fall. Patient states that he was walking up some steps and twisted and fell, landing on his right knee. He was able to walk with a limp but had pain in his right knee prompting his trip to the emergency department. He denies any head or loss of consciousness. PCP: None    There are no other complaints, changes, or physical findings at this time. Current Outpatient Medications   Medication Sig Dispense Refill    HYDROcodone-acetaminophen (Norco) 5-325 mg per tablet Take 1 Tablet by mouth every four (4) hours as needed for Pain for up to 3 days. Max Daily Amount: 6 Tablets. 18 Tablet 0    methylPREDNISolone (Medrol, Madhav,) 4 mg tablet Take as instructed 1 Dose Pack 0    methocarbamoL (Robaxin) 500 mg tablet Take 1 Tab by mouth every six (6) hours as needed (mm spasm). 20 Tab 0    ibuprofen (MOTRIN) 800 mg tablet Take  by mouth every six (6) hours as needed for Pain.  DM/PSEUDOEPHED/ACETAMINOPHEN (THERAFLU NO-DROWSY FLU/CLD/CGH PO) Take 2 tablets by mouth every four (4) hours as needed.  albuterol (PROVENTIL HFA, VENTOLIN HFA, PROAIR HFA) 90 mcg/actuation inhaler Take 4 puffs by inhalation every four (4) hours as needed for Wheezing. 1 Inhaler 1     Past History     Past Medical History:  History reviewed. No pertinent past medical history. Past Surgical History:  History reviewed. No pertinent surgical history. Family History:  History reviewed. No pertinent family history.   Social History:  Social History     Tobacco Use    Smoking status: Current Every Day Smoker     Packs/day: 0.50    Smokeless tobacco: Never Used Substance Use Topics    Alcohol use: Yes    Drug use: No     Allergies:  No Known Allergies  Review of Systems   Review of Systems   Musculoskeletal: Positive for arthralgias. All other systems reviewed and are negative. Physical Exam   Physical Exam  Vitals and nursing note reviewed. Constitutional:       General: He is not in acute distress. Appearance: He is well-developed. HENT:      Head: Normocephalic and atraumatic. Eyes:      Conjunctiva/sclera: Conjunctivae normal.      Pupils: Pupils are equal, round, and reactive to light. Cardiovascular:      Rate and Rhythm: Normal rate and regular rhythm. Pulmonary:      Effort: Pulmonary effort is normal. No respiratory distress. Breath sounds: Normal breath sounds. No stridor. Abdominal:      General: There is no distension. Palpations: Abdomen is soft. Tenderness: There is no abdominal tenderness. Musculoskeletal:      Cervical back: Normal range of motion. Right knee: Swelling and effusion present. Comments: R knee with overlying abrasion, distally neurovascular intact   Skin:     General: Skin is warm and dry. Neurological:      Mental Status: He is alert and oriented to person, place, and time. Diagnostic Study Results   Labs -   No results found for this or any previous visit (from the past 12 hour(s)). Radiologic Studies -   CT LOW EXT RT WO CONT         XR KNEE RT 3 V   Final Result   Comminuted intra-articular tibial plateau fracture with large joint   effusion. XR KNEE RT 3 V    Result Date: 3/12/2022  Comminuted intra-articular tibial plateau fracture with large joint effusion. Medical Decision Making   I am the first provider for this patient. I reviewed the vital signs, available nursing notes, past medical history, past surgical history, family history and social history. Vital Signs-Reviewed the patient's vital signs.   Patient Vitals for the past 12 hrs:   Temp Pulse Resp BP SpO2   03/12/22 1935 98.2 °F (36.8 °C) 86 17 (!) 158/86 99 %   03/12/22 1801 98.4 °F (36.9 °C) 90 19 (!) 161/93 97 %       Pulse Oximetry Analysis - 99% on ra      Records Reviewed: Nursing Notes and Old Medical Records    Provider Notes (Medical Decision Making):   Patient presents with right knee pain after mechanical fall. Exam has no overlying abrasion and the knee does have a moderate to large effusion present. Will check x-ray to rule out fracture. ED Course:   Initial assessment performed. The patients presenting problems have been discussed, and they are in agreement with the care plan formulated and outlined with them. I have encouraged them to ask questions as they arise throughout their visit. X-ray shows tibial plateau fracture with intra-articular extension. Discussed with Dr. Kimmie Sanz, orthopedics via [facility]. Plan for knee immobilizer, crutches, nonweightbearing. We will get a CT of his lower extremity prior to discharge. Will follow up with orthopedics next week. Procedures:  Procedures    Critical Care:  none    Disposition:  Discharge Note:  The patient has been re-evaluated and is ready for discharge. Reviewed available results with patient. Counseled patient on diagnosis and care plan. Patient has expressed understanding, and all questions have been answered. Patient agrees with plan and agrees to follow up as recommended, or to return to the ED if their symptoms worsen. Discharge instructions have been provided and explained to the patient, along with reasons to return to the ED. PLAN:  1. Discharge Medication List as of 3/12/2022  7:27 PM      START taking these medications    Details   HYDROcodone-acetaminophen (Norco) 5-325 mg per tablet Take 1 Tablet by mouth every four (4) hours as needed for Pain for up to 3 days.  Max Daily Amount: 6 Tablets., Normal, Disp-18 Tablet, R-0         CONTINUE these medications which have NOT CHANGED    Details   methylPREDNISolone (Medrol, Madhav,) 4 mg tablet Take as instructed, Normal, Disp-1 Dose Pack, R-0      methocarbamoL (Robaxin) 500 mg tablet Take 1 Tab by mouth every six (6) hours as needed (mm spasm). , Normal, Disp-20 Tab, R-0      ibuprofen (MOTRIN) 800 mg tablet Take  by mouth every six (6) hours as needed for Pain., Historical Med      DM/PSEUDOEPHED/ACETAMINOPHEN (THERAFLU NO-DROWSY FLU/CLD/CGH PO) Take 2 tablets by mouth every four (4) hours as needed., Historical Med      albuterol (PROVENTIL HFA, VENTOLIN HFA, PROAIR HFA) 90 mcg/actuation inhaler Take 4 puffs by inhalation every four (4) hours as needed for Wheezing., Print, Disp-1 Inhaler, R-1           2. Follow-up Information     Follow up With Specialties Details Why Contact Info    Giorgio Ask, DO Orthopedic Surgery Schedule an appointment as soon as possible for a visit in 3 days call Monday for an appointment. The office also might call you [address] [place] [address]  [address] [address] [phone]      [address] [place] [address] EMERGENCY DEPT Emergency Medicine  As needed, If symptoms worsen [facility]  [phone]        Return to ED if worse     Diagnosis     Clinical Impression:   1. Closed fracture of right tibial plateau, initial encounter            Please note that this dictation was completed with [employer], the computer voice recognition software. Quite often unanticipated grammatical, syntax, homophones, and other interpretive errors are inadvertently transcribed by the computer software. Please disregard these errors.   Please excuse any errors that have escaped final proofreading Brow Lift Text: A midfrontal incision was made medially to the defect to allow access to the tissues just superior to the left eyebrow. Following careful dissection inferiorly in a supraperiosteal plane to the level of the left eyebrow, several 3-0 monocryl sutures were used to resuspend the eyebrow orbicularis oculi muscular unit to the superior frontal bone periosteum. This resulted in an appropriate reapproximation of static eyebrow symmetry and correction of the left brow ptosis.

## 2023-05-25 RX ORDER — METHOCARBAMOL 500 MG/1
500 TABLET, FILM COATED ORAL EVERY 6 HOURS PRN
COMMUNITY
Start: 2021-04-21

## 2023-05-25 RX ORDER — METHYLPREDNISOLONE 4 MG/1
TABLET ORAL
COMMUNITY
Start: 2021-04-21

## 2023-05-25 RX ORDER — ALBUTEROL SULFATE 90 UG/1
4 AEROSOL, METERED RESPIRATORY (INHALATION) EVERY 4 HOURS PRN
COMMUNITY
Start: 2015-01-26

## 2023-11-21 ENCOUNTER — HOSPITAL ENCOUNTER (EMERGENCY)
Facility: HOSPITAL | Age: 58
Discharge: HOME | End: 2023-11-21
Attending: STUDENT IN AN ORGANIZED HEALTH CARE EDUCATION/TRAINING PROGRAM
Payer: MEDICAID

## 2023-11-21 ENCOUNTER — APPOINTMENT (OUTPATIENT)
Facility: HOSPITAL | Age: 58
End: 2023-11-21
Payer: MEDICAID

## 2023-11-21 VITALS
HEART RATE: 89 BPM | WEIGHT: 176 LBS | OXYGEN SATURATION: 95 % | SYSTOLIC BLOOD PRESSURE: 152 MMHG | TEMPERATURE: 98.2 F | DIASTOLIC BLOOD PRESSURE: 88 MMHG | BODY MASS INDEX: 26.67 KG/M2 | RESPIRATION RATE: 17 BRPM | HEIGHT: 68 IN

## 2023-11-21 DIAGNOSIS — K40.90 UNILATERAL INGUINAL HERNIA WITHOUT OBSTRUCTION OR GANGRENE, RECURRENCE NOT SPECIFIED: Primary | ICD-10-CM

## 2023-11-21 DIAGNOSIS — K76.0 HEPATIC STEATOSIS: ICD-10-CM

## 2023-11-21 LAB — CREAT SERPL-MCNC: 0.95 MG/DL (ref 0.7–1.3)

## 2023-11-21 PROCEDURE — 6360000004 HC RX CONTRAST MEDICATION: Performed by: STUDENT IN AN ORGANIZED HEALTH CARE EDUCATION/TRAINING PROGRAM

## 2023-11-21 PROCEDURE — 82565 ASSAY OF CREATININE: CPT

## 2023-11-21 PROCEDURE — 99285 EMERGENCY DEPT VISIT HI MDM: CPT

## 2023-11-21 PROCEDURE — 74177 CT ABD & PELVIS W/CONTRAST: CPT

## 2023-11-21 RX ADMIN — IOPAMIDOL 100 ML: 755 INJECTION, SOLUTION INTRAVENOUS at 18:54

## 2023-11-21 ASSESSMENT — PAIN - FUNCTIONAL ASSESSMENT: PAIN_FUNCTIONAL_ASSESSMENT: NONE - DENIES PAIN

## 2023-11-21 NOTE — ED TRIAGE NOTES
Pt reports noticing a small bump on his groin x1 month ago and states pain is worse with strenuous activities.